# Patient Record
Sex: FEMALE | Race: WHITE | NOT HISPANIC OR LATINO | Employment: FULL TIME | URBAN - METROPOLITAN AREA
[De-identification: names, ages, dates, MRNs, and addresses within clinical notes are randomized per-mention and may not be internally consistent; named-entity substitution may affect disease eponyms.]

---

## 2020-08-31 ENCOUNTER — HISTORICAL (OUTPATIENT)
Dept: ADMINISTRATIVE | Facility: HOSPITAL | Age: 20
End: 2020-08-31

## 2020-08-31 LAB
ALBUMIN SERPL-MCNC: 3.8 GM/DL (ref 3.5–5)
ALBUMIN/GLOB SERPL: 1.4 RATIO (ref 1.1–2)
ALP SERPL-CCNC: 52 UNIT/L (ref 40–150)
ALT SERPL-CCNC: 12 UNIT/L (ref 0–55)
AST SERPL-CCNC: 19 UNIT/L (ref 5–34)
BILIRUB SERPL-MCNC: 0.6 MG/DL
BILIRUBIN DIRECT+TOT PNL SERPL-MCNC: 0.2 MG/DL (ref 0–0.5)
BILIRUBIN DIRECT+TOT PNL SERPL-MCNC: 0.4 MG/DL (ref 0–0.8)
BUN SERPL-MCNC: 12.1 MG/DL (ref 7–18.7)
CALCIUM SERPL-MCNC: 8.5 MG/DL (ref 8.4–10.2)
CHLORIDE SERPL-SCNC: 104 MMOL/L (ref 98–107)
CHOLEST SERPL-MCNC: 189 MG/DL
CHOLEST/HDLC SERPL: 3 {RATIO} (ref 0–5)
CO2 SERPL-SCNC: 26 MMOL/L (ref 22–29)
CREAT SERPL-MCNC: 0.71 MG/DL (ref 0.55–1.02)
ERYTHROCYTE [DISTWIDTH] IN BLOOD BY AUTOMATED COUNT: 12.7 % (ref 11.5–17)
GLOBULIN SER-MCNC: 2.8 GM/DL (ref 2.4–3.5)
GLUCOSE SERPL-MCNC: 81 MG/DL (ref 74–100)
HCT VFR BLD AUTO: 42.7 % (ref 37–47)
HDLC SERPL-MCNC: 74 MG/DL (ref 35–60)
HGB BLD-MCNC: 13.5 GM/DL (ref 12–16)
LDLC SERPL CALC-MCNC: 102 MG/DL (ref 50–140)
MCH RBC QN AUTO: 30 PG (ref 27–31)
MCHC RBC AUTO-ENTMCNC: 31.6 GM/DL (ref 33–36)
MCV RBC AUTO: 94.9 FL (ref 80–94)
PLATELET # BLD AUTO: 325 X10(3)/MCL (ref 130–400)
PMV BLD AUTO: 10.8 FL (ref 9.4–12.4)
POTASSIUM SERPL-SCNC: 4.1 MMOL/L (ref 3.5–5.1)
PROT SERPL-MCNC: 6.6 GM/DL (ref 6.4–8.3)
RBC # BLD AUTO: 4.5 X10(6)/MCL (ref 4.2–5.4)
SODIUM SERPL-SCNC: 140 MMOL/L (ref 136–145)
TRIGL SERPL-MCNC: 67 MG/DL (ref 37–140)
TSH SERPL-ACNC: 2.11 UIU/ML (ref 0.35–4.94)
VLDLC SERPL CALC-MCNC: 13 MG/DL
WBC # SPEC AUTO: 6.2 X10(3)/MCL (ref 4.5–11.5)

## 2020-09-10 ENCOUNTER — HISTORICAL (OUTPATIENT)
Dept: PREADMISSION TESTING | Facility: HOSPITAL | Age: 20
End: 2020-09-10

## 2020-09-10 LAB
ABS NEUT (OLG): 3.63 X10(3)/MCL (ref 2.1–9.2)
ALBUMIN SERPL-MCNC: 4.2 GM/DL (ref 3.5–5)
ALBUMIN/GLOB SERPL: 1.4 RATIO (ref 1.1–2)
ALP SERPL-CCNC: 52 UNIT/L (ref 40–150)
ALT SERPL-CCNC: 16 UNIT/L (ref 0–55)
AST SERPL-CCNC: 23 UNIT/L (ref 5–34)
BASOPHILS # BLD AUTO: 0 X10(3)/MCL (ref 0–0.2)
BASOPHILS NFR BLD AUTO: 1 %
BILIRUB SERPL-MCNC: 0.8 MG/DL
BILIRUBIN DIRECT+TOT PNL SERPL-MCNC: 0.3 MG/DL (ref 0–0.5)
BILIRUBIN DIRECT+TOT PNL SERPL-MCNC: 0.5 MG/DL (ref 0–0.8)
BUN SERPL-MCNC: 11.2 MG/DL (ref 7–18.7)
CALCIUM SERPL-MCNC: 9.3 MG/DL (ref 8.4–10.2)
CHLORIDE SERPL-SCNC: 104 MMOL/L (ref 98–107)
CO2 SERPL-SCNC: 24 MMOL/L (ref 22–29)
CREAT SERPL-MCNC: 0.75 MG/DL (ref 0.55–1.02)
EOSINOPHIL # BLD AUTO: 0.2 X10(3)/MCL (ref 0–0.9)
EOSINOPHIL NFR BLD AUTO: 2 %
ERYTHROCYTE [DISTWIDTH] IN BLOOD BY AUTOMATED COUNT: 12.8 % (ref 11.5–17)
GLOBULIN SER-MCNC: 2.9 GM/DL (ref 2.4–3.5)
GLUCOSE SERPL-MCNC: 122 MG/DL (ref 74–100)
HCT VFR BLD AUTO: 42.8 % (ref 37–47)
HGB BLD-MCNC: 13.5 GM/DL (ref 12–16)
LYMPHOCYTES # BLD AUTO: 2.4 X10(3)/MCL (ref 0.6–4.6)
LYMPHOCYTES NFR BLD AUTO: 36 %
MCH RBC QN AUTO: 29.6 PG (ref 27–31)
MCHC RBC AUTO-ENTMCNC: 31.5 GM/DL (ref 33–36)
MCV RBC AUTO: 93.9 FL (ref 80–94)
MONOCYTES # BLD AUTO: 0.5 X10(3)/MCL (ref 0.1–1.3)
MONOCYTES NFR BLD AUTO: 7 %
NEUTROPHILS # BLD AUTO: 3.63 X10(3)/MCL (ref 2.1–9.2)
NEUTROPHILS NFR BLD AUTO: 54 %
PLATELET # BLD AUTO: 312 X10(3)/MCL (ref 130–400)
PMV BLD AUTO: 11.1 FL (ref 9.4–12.4)
POTASSIUM SERPL-SCNC: 4.7 MMOL/L (ref 3.5–5.1)
PROT SERPL-MCNC: 7.1 GM/DL (ref 6.4–8.3)
RBC # BLD AUTO: 4.56 X10(6)/MCL (ref 4.2–5.4)
SODIUM SERPL-SCNC: 140 MMOL/L (ref 136–145)
WBC # SPEC AUTO: 6.8 X10(3)/MCL (ref 4.5–11.5)

## 2020-09-14 ENCOUNTER — HISTORICAL (OUTPATIENT)
Dept: ADMINISTRATIVE | Facility: HOSPITAL | Age: 20
End: 2020-09-14

## 2022-04-10 ENCOUNTER — HISTORICAL (OUTPATIENT)
Dept: ADMINISTRATIVE | Facility: HOSPITAL | Age: 22
End: 2022-04-10

## 2022-04-26 VITALS
SYSTOLIC BLOOD PRESSURE: 110 MMHG | WEIGHT: 145 LBS | OXYGEN SATURATION: 99 % | HEIGHT: 65 IN | BODY MASS INDEX: 24.16 KG/M2 | DIASTOLIC BLOOD PRESSURE: 62 MMHG

## 2022-04-30 NOTE — OP NOTE
Patient:   Priscilla Pritchett             MRN: [id number]            FIN: [id number]               Age:   20 years     Sex:  Female     :  2000   Associated Diagnoses:   None   Author:   Ishmael Torres MD            DATE OF SURGERY: 20     SURGEON:  Ishmael Torres MD  ASSISTANT:  None    PREOPERATIVE DIAGNOSIS:  Soft Tissue Mass - Left Back    POSTOPERATIVE DIAGNOSIS:  Same.    OPERATIONS:  Excision of Soft Tissue Mass    Anesthesia:  LMA / Local   Estimated blood loss: Minimal (< 20)  Blood administered:  None.   Lap and instrument counts correct x 2 at the end of the case.    SPECIMEN: Soft tissue Mass approximately 8 cm    INDICATIONS/SIGNIFICANT HISTORY:  The patient is a 20 year old female who seen with complaints of a soft tissue mass.   Risks and Benefits of surgical excision was discussed with the patient, who voiced understanding of risks and benefits and elected to proceed with surgery.    PROCEDURE IN DETAIL:  Once informed consents were obtained, the patient was taken to the operating room and placed on the operating table.  After anesthesia was induced, the marked area was prepped and draped in a standard surgical fashion.  Local anesthesia was obtained with 0.25% marcaine with epinephrine.  An incision was made around the soft tissue mass and it was dissected with blunt and sharp dissection and the mass was passed off the table as a specimen.  The wound was then irrigated and dried and bleeding stopped with cautery.  The skin was then close with suture in a 2 layer fashion.  The wounds were cleaned and dried and steri-strips were applied.  The patient tolerated the procedure well and was transported to recovery room in good condition.

## 2022-09-12 DIAGNOSIS — R53.83 FATIGUE, UNSPECIFIED TYPE: ICD-10-CM

## 2022-09-12 DIAGNOSIS — Z00.00 WELLNESS EXAMINATION: Primary | ICD-10-CM

## 2022-09-12 DIAGNOSIS — R73.01 ELEVATED FASTING GLUCOSE: ICD-10-CM

## 2022-09-12 DIAGNOSIS — Z13.6 SCREENING FOR HYPERTENSION: ICD-10-CM

## 2022-09-12 DIAGNOSIS — E78.5 ELEVATED FASTING LIPID PROFILE: ICD-10-CM

## 2022-09-13 ENCOUNTER — TELEPHONE (OUTPATIENT)
Dept: INTERNAL MEDICINE | Facility: CLINIC | Age: 22
End: 2022-09-13

## 2022-09-13 NOTE — TELEPHONE ENCOUNTER
----- Message from Adela Lejeune sent at 9/12/2022  2:27 PM CDT -----  Regarding: RE: pv ifrah 9/19 0900  Pt informed of OV, labs, and check in protocol.  She verbalized understanding.   ----- Message -----  From: Alma Beverly LPN  Sent: 9/12/2022   1:56 PM CDT  To: Adela Varnerjeune  Subject: pv ifrah 9/19 0900                               Are there any outstanding tasks in chart? No, but needs FASTING labs PRIOR to appt    Is there any documentation of tasks? no    Has the pt seen another physician, been to ER, [facility], or admitted to hospital since last visit?    Has the pt done blood work or imaging since last visit?

## 2022-09-13 NOTE — TELEPHONE ENCOUNTER
----- Message from Adela Lejeune sent at 9/12/2022  2:30 PM CDT -----  Regarding: RE: pv ifrah 9/19 0900  Pt informed of OV, labs, and check in protocol.  She verbalized understanding.   ----- Message -----  From: Alma Beverly LPN  Sent: 9/12/2022   1:56 PM CDT  To: Adela Varnerjeune  Subject: pv ifrah 9/19 0900                               Are there any outstanding tasks in chart? No, but needs FASTING labs PRIOR to appt    Is there any documentation of tasks? no    Has the pt seen another physician, been to ER, [facility], or admitted to hospital since last visit?    Has the pt done blood work or imaging since last visit?

## 2022-09-14 ENCOUNTER — DOCUMENTATION ONLY (OUTPATIENT)
Dept: INTERNAL MEDICINE | Facility: CLINIC | Age: 22
End: 2022-09-14

## 2022-09-14 LAB
CHOLEST SERPL-MSCNC: 155 MG/DL (ref 0–200)
HBA1C MFR BLD: 5.1 % (ref 4–6)
HDLC SERPL-MCNC: 85 MG/DL (ref 35–70)
LDLC SERPL CALC-MCNC: 70 MG/DL (ref 0–160)
TRIGL SERPL-MCNC: 40 MG/DL (ref 40–160)

## 2022-09-15 ENCOUNTER — DOCUMENTATION ONLY (OUTPATIENT)
Dept: INTERNAL MEDICINE | Facility: CLINIC | Age: 22
End: 2022-09-15

## 2022-09-19 ENCOUNTER — OFFICE VISIT (OUTPATIENT)
Dept: INTERNAL MEDICINE | Facility: CLINIC | Age: 22
End: 2022-09-19

## 2022-09-19 VITALS
WEIGHT: 141 LBS | HEART RATE: 65 BPM | RESPIRATION RATE: 16 BRPM | OXYGEN SATURATION: 96 % | SYSTOLIC BLOOD PRESSURE: 118 MMHG | DIASTOLIC BLOOD PRESSURE: 76 MMHG | HEIGHT: 65 IN | BODY MASS INDEX: 23.49 KG/M2

## 2022-09-19 DIAGNOSIS — Z00.00 ANNUAL PHYSICAL EXAM: Primary | ICD-10-CM

## 2022-09-19 PROCEDURE — 99395 PREV VISIT EST AGE 18-39: CPT | Mod: ,,, | Performed by: INTERNAL MEDICINE

## 2022-09-19 PROCEDURE — 99395 PR PREVENTIVE VISIT,EST,18-39: ICD-10-PCS | Mod: ,,, | Performed by: INTERNAL MEDICINE

## 2022-09-19 NOTE — PROGRESS NOTES
Patient ID: Priscilla Pritchett is a 22 y.o. female.    Chief Complaint: Annual Exam      HPI:   Patient presents here today for above reason.     Patient is without any major complaints or concerns at this time.       The patient's Health Maintenance was reviewed and the following appears to be due at this time:   Health Maintenance Due   Topic Date Due    Hepatitis C Screening  Never done    COVID-19 Vaccine (1) Never done    HPV Vaccines (2 - 2-dose series) 01/12/2011    HIV Screening  Never done    TETANUS VACCINE  Never done    Pap Smear  Never done    Influenza Vaccine (1) Never done        Past Medical History:  History reviewed. No pertinent past medical history.  History reviewed. No pertinent surgical history.  Review of patient's allergies indicates:   Allergen Reactions    Augmentin [amoxicillin-pot clavulanate]      No current outpatient medications on file prior to visit.     No current facility-administered medications on file prior to visit.     Social History     Socioeconomic History    Marital status: Single   Tobacco Use    Smoking status: Never    Smokeless tobacco: Never   Substance and Sexual Activity    Alcohol use: Not Currently     History reviewed. No pertinent family history.    ROS:   Review of Systems  Constitutional: No weight gain, No fever, No chills, No fatigue.   Eyes: No blurring, No visual disturbances.   Ear/Nose/Mouth/Throat: No decreased hearing, No ear pain, No nasal congestion, No sore throat.   Respiratory: No shortness of breath, No cough, No wheezing.   Cardiovascular: No chest pain, No palpitations, No peripheral edema.   Gastrointestinal: No nausea, No vomiting, No diarrhea, No constipation, No abdominal pain.   Genitourinary: No dysuria, No hematuria.   Hematology/Lymphatics: No bruising tendency, No bleeding tendency, No swollen lymph glands.   Endocrine: No excessive thirst, No polyuria, No excessive hunger.   Musculoskeletal: No joint pain, No muscle pain, No decreased  "range of motion.   Integumentary: No rash, No pruritus.   Neurologic: No abnormal balance, No confusion, No headache.   Psychiatric: No anxiety, No depression, Not suicidal, No hallucinations.      Vitals/PE:   /76 (BP Location: Left arm, Patient Position: Sitting)   Pulse 65   Resp 16   Ht 5' 5" (1.651 m)   Wt 64 kg (141 lb)   LMP 09/13/2022   SpO2 96%   BMI 23.46 kg/m²   Physical Exam    General: Alert and oriented, No acute distress.   Eye: Normal conjunctiva without exudate.  HENMT: Normocephalic/AT, Normal hearing, Oral mucosa is moist and pink   Neck: No goiter visualized.   Respiratory: Lungs CTAB, Respirations are non-labored, Breath sounds are equal, Symmetrical chest wall expansion.  Cardiovascular: Normal rate, Regular rhythm, No murmur, No edema.   Gastrointestinal: Non-distended.   Genitourinary: Deferred.  Musculoskeletal: Normal ROM, Normal gait, No deformities or amputations.  Integumentary: Warm, Dry, Intact. No diaphoresis, or flushing.  Neurologic: No focal deficits, Cranial Nerves II-XII are grossly intact.   Psychiatric: Cooperative, Appropriate mood & affect, Normal judgment, Non-suicidal.    Assessment/Plan:    ..  Problem List Items Addressed This Visit    None  Visit Diagnoses       Annual physical exam    -  Primary         All labs  discussed all OK   Recommendations:   Diet (healthy food choices, reduce portions and overall calorie intake)  Exercise 30-45 minutes 3x per week  Avoid excessive alcohol and tobacco  Stay UTD with immunizations and preventative exams  Monthly self breast exams   Yearly Labs     ..      ..No orders of the defined types were placed in this encounter.        Priscilla Pritchett does not currently have medications on file.    No orders of the defined types were placed in this encounter.      Education and counseling done face to face regarding medical conditions and plan. Contact office if new symptoms develop. Should any symptoms ever significantly " worsen seek emergency medical attention/go to ER. Follow up at least yearly for wellness or sooner PRN. Nurse to call patient with any results. The patient is receptive, expresses understanding and is agreeable to plan. All questions have been answered.    No follow-ups on file.

## 2023-09-18 ENCOUNTER — TELEPHONE (OUTPATIENT)
Dept: INTERNAL MEDICINE | Facility: CLINIC | Age: 23
End: 2023-09-18

## 2023-09-18 DIAGNOSIS — Z00.00 WELLNESS EXAMINATION: Primary | ICD-10-CM

## 2023-09-18 NOTE — TELEPHONE ENCOUNTER
----- Message from Ryan Hoyt sent at 9/18/2023 10:04 AM CDT -----  .Type:  Needs Medical Advice    Who Called: Priscilla  Symptoms (please be specific):    How long has patient had these symptoms:    Pharmacy name and phone #:    Would the patient rather a call back or a response via [facility]?   Best Call Back Number: [phone]  Additional Information: So per the patient's request I cancelled her one year review apt on 9/25/2023 and she told me that the doctor had spoken with her before and told her she did not need to be seen but every other year. Please review and let her know if that will be ok.  Again she told me that the doctor advised her of that.

## 2023-09-18 NOTE — TELEPHONE ENCOUNTER
"----- Message from Jazmine Oropeza LPN sent at 9/18/2023  8:07 AM CDT -----  Regarding: Wellness 9/25/23 9AM  Are there any outstanding tasks in chart? No, but needs FASTING labs, TO BE DONE AT  "[facility]" or lab location of choice PRIOR to appt    Is there any documentation of tasks? no    Has the pt seen another physician, been to ER, [facility], or admitted to hospital since last visit?    Has the pt done blood work or imaging since last visit?    5. PLEASE HAVE PATIENT BRING MEDICATION LIST OR BOTTLES TO EVERY OFFICE VISIT     "